# Patient Record
Sex: MALE
[De-identification: names, ages, dates, MRNs, and addresses within clinical notes are randomized per-mention and may not be internally consistent; named-entity substitution may affect disease eponyms.]

---

## 2017-03-07 ENCOUNTER — RX ONLY (OUTPATIENT)
Age: 72
Setting detail: RX ONLY
End: 2017-03-07

## 2017-09-05 ENCOUNTER — RX ONLY (OUTPATIENT)
Age: 72
Setting detail: RX ONLY
End: 2017-09-05

## 2017-09-12 ENCOUNTER — RX ONLY (OUTPATIENT)
Age: 72
Setting detail: RX ONLY
End: 2017-09-12

## 2017-11-09 ENCOUNTER — RX ONLY (OUTPATIENT)
Age: 72
Setting detail: RX ONLY
End: 2017-11-09

## 2017-11-28 ENCOUNTER — RX ONLY (OUTPATIENT)
Age: 72
Setting detail: RX ONLY
End: 2017-11-28

## 2018-04-24 ENCOUNTER — RX ONLY (OUTPATIENT)
Age: 73
Setting detail: RX ONLY
End: 2018-04-24

## 2018-05-01 ENCOUNTER — RX ONLY (OUTPATIENT)
Age: 73
Setting detail: RX ONLY
End: 2018-05-01

## 2018-05-22 ENCOUNTER — RX ONLY (OUTPATIENT)
Age: 73
Setting detail: RX ONLY
End: 2018-05-22

## 2018-06-19 ENCOUNTER — RX ONLY (OUTPATIENT)
Age: 73
Setting detail: RX ONLY
End: 2018-06-19

## 2018-06-26 ENCOUNTER — APPOINTMENT (RX ONLY)
Dept: URBAN - METROPOLITAN AREA CLINIC 319 | Facility: CLINIC | Age: 73
Setting detail: DERMATOLOGY
End: 2018-06-26

## 2018-06-26 DIAGNOSIS — Z48.817 ENCOUNTER FOR SURGICAL AFTERCARE FOLLOWING SURGERY ON THE SKIN AND SUBCUTANEOUS TISSUE: ICD-10-CM

## 2018-06-26 PROCEDURE — ? POST-OP WOUND CHECK

## 2018-06-26 PROCEDURE — 99024 POSTOP FOLLOW-UP VISIT: CPT

## 2018-06-26 ASSESSMENT — LOCATION SIMPLE DESCRIPTION DERM: LOCATION SIMPLE: LEFT MIDDLE FINGER

## 2018-06-26 ASSESSMENT — LOCATION ZONE DERM: LOCATION ZONE: FINGER

## 2018-06-26 ASSESSMENT — LOCATION DETAILED DESCRIPTION DERM: LOCATION DETAILED: LEFT PROXIMAL DORSAL MIDDLE FINGER

## 2018-06-26 NOTE — PROCEDURE: POST-OP WOUND CHECK
Detail Level: Detailed
Add 90903 Cpt? (Important Note: In 2017 The Use Of 02285 Is Being Tracked By Cms To Determine Future Global Period Reimbursement For Global Periods): yes

## 2018-06-29 ENCOUNTER — APPOINTMENT (RX ONLY)
Dept: URBAN - METROPOLITAN AREA CLINIC 319 | Facility: CLINIC | Age: 73
Setting detail: DERMATOLOGY
End: 2018-06-29

## 2018-06-29 DIAGNOSIS — Z48.817 ENCOUNTER FOR SURGICAL AFTERCARE FOLLOWING SURGERY ON THE SKIN AND SUBCUTANEOUS TISSUE: ICD-10-CM

## 2018-06-29 PROCEDURE — 99024 POSTOP FOLLOW-UP VISIT: CPT

## 2018-06-29 PROCEDURE — ? POST-OP WOUND CHECK

## 2018-06-29 ASSESSMENT — LOCATION ZONE DERM: LOCATION ZONE: FINGER

## 2018-06-29 ASSESSMENT — LOCATION SIMPLE DESCRIPTION DERM: LOCATION SIMPLE: LEFT MIDDLE FINGER

## 2018-06-29 ASSESSMENT — LOCATION DETAILED DESCRIPTION DERM: LOCATION DETAILED: LEFT PROXIMAL DORSAL MIDDLE FINGER

## 2018-06-29 NOTE — PROCEDURE: POST-OP WOUND CHECK
Detail Level: Detailed
Add 92085 Cpt? (Important Note: In 2017 The Use Of 64316 Is Being Tracked By Cms To Determine Future Global Period Reimbursement For Global Periods): yes
Additional Comments: Pt will change bandage daily and RTC on Tuesday 7/3 for s/r.
Body Location Override (Optional - Billing Will Still Be Based On Selected Body Map Location If Applicable): left 3rd finger
Wound Evaluated By: Natali

## 2018-07-03 ENCOUNTER — APPOINTMENT (RX ONLY)
Dept: URBAN - METROPOLITAN AREA CLINIC 319 | Facility: CLINIC | Age: 73
Setting detail: DERMATOLOGY
End: 2018-07-03

## 2018-07-03 DIAGNOSIS — Z48.817 ENCOUNTER FOR SURGICAL AFTERCARE FOLLOWING SURGERY ON THE SKIN AND SUBCUTANEOUS TISSUE: ICD-10-CM

## 2018-07-03 PROBLEM — N18.9 CHRONIC KIDNEY DISEASE, UNSPECIFIED: Status: ACTIVE | Noted: 2018-07-03

## 2018-07-03 PROBLEM — Z85.828 PERSONAL HISTORY OF OTHER MALIGNANT NEOPLASM OF SKIN: Status: ACTIVE | Noted: 2018-07-03

## 2018-07-03 PROCEDURE — ? POST-OP WOUND CHECK

## 2018-07-03 PROCEDURE — 99024 POSTOP FOLLOW-UP VISIT: CPT

## 2018-07-03 ASSESSMENT — LOCATION SIMPLE DESCRIPTION DERM: LOCATION SIMPLE: LEFT MIDDLE FINGER

## 2018-07-03 ASSESSMENT — LOCATION ZONE DERM: LOCATION ZONE: FINGER

## 2018-07-03 ASSESSMENT — LOCATION DETAILED DESCRIPTION DERM: LOCATION DETAILED: LEFT PROXIMAL DORSAL MIDDLE FINGER

## 2018-07-03 NOTE — PROCEDURE: POST-OP WOUND CHECK
Body Location Override (Optional - Billing Will Still Be Based On Selected Body Map Location If Applicable): left 3rd finger
Wound Assessment Override (Optional): graft was crusted and dry
Add 67100 Cpt? (Important Note: In 2017 The Use Of 34196 Is Being Tracked By Cms To Determine Future Global Period Reimbursement For Global Periods): yes
Wound Evaluated By: Dr Garces
Detail Level: Detailed
Additional Comments: Graft appears to viable but has crusting on the surface as he forgot to use white petrolatum.

## 2018-07-06 ENCOUNTER — APPOINTMENT (RX ONLY)
Dept: URBAN - METROPOLITAN AREA CLINIC 319 | Facility: CLINIC | Age: 73
Setting detail: DERMATOLOGY
End: 2018-07-06

## 2018-07-06 DIAGNOSIS — Z48.817 ENCOUNTER FOR SURGICAL AFTERCARE FOLLOWING SURGERY ON THE SKIN AND SUBCUTANEOUS TISSUE: ICD-10-CM

## 2018-07-06 PROCEDURE — ? POST-OP WOUND CHECK

## 2018-07-06 PROCEDURE — 99024 POSTOP FOLLOW-UP VISIT: CPT

## 2018-07-06 ASSESSMENT — LOCATION SIMPLE DESCRIPTION DERM: LOCATION SIMPLE: LEFT MIDDLE FINGER

## 2018-07-06 ASSESSMENT — LOCATION DETAILED DESCRIPTION DERM: LOCATION DETAILED: LEFT PROXIMAL DORSAL MIDDLE FINGER

## 2018-07-06 ASSESSMENT — LOCATION ZONE DERM: LOCATION ZONE: FINGER

## 2018-07-06 NOTE — PROCEDURE: POST-OP WOUND CHECK
Add 57583 Cpt? (Important Note: In 2017 The Use Of 68885 Is Being Tracked By Cms To Determine Future Global Period Reimbursement For Global Periods): yes
Detail Level: Detailed
Wound Assessment Override (Optional): graft was crusted and dry
Body Location Override (Optional - Billing Will Still Be Based On Selected Body Map Location If Applicable): left 3rd finger

## 2018-07-10 ENCOUNTER — APPOINTMENT (RX ONLY)
Dept: URBAN - METROPOLITAN AREA CLINIC 319 | Facility: CLINIC | Age: 73
Setting detail: DERMATOLOGY
End: 2018-07-10

## 2018-07-10 DIAGNOSIS — Z48.817 ENCOUNTER FOR SURGICAL AFTERCARE FOLLOWING SURGERY ON THE SKIN AND SUBCUTANEOUS TISSUE: ICD-10-CM

## 2018-07-10 PROCEDURE — 99024 POSTOP FOLLOW-UP VISIT: CPT

## 2018-07-10 PROCEDURE — ? POST-OP WOUND CHECK

## 2018-07-10 ASSESSMENT — LOCATION SIMPLE DESCRIPTION DERM: LOCATION SIMPLE: LEFT MIDDLE FINGER

## 2018-07-10 ASSESSMENT — LOCATION ZONE DERM: LOCATION ZONE: FINGER

## 2018-07-10 ASSESSMENT — LOCATION DETAILED DESCRIPTION DERM: LOCATION DETAILED: LEFT PROXIMAL DORSAL MIDDLE FINGER

## 2018-07-10 NOTE — PROCEDURE: POST-OP WOUND CHECK
Body Location Override (Optional - Billing Will Still Be Based On Selected Body Map Location If Applicable): left 3rd finger
Add 03964 Cpt? (Important Note: In 2017 The Use Of 85215 Is Being Tracked By Cms To Determine Future Global Period Reimbursement For Global Periods): yes
Detail Level: Detailed

## 2018-07-17 ENCOUNTER — APPOINTMENT (RX ONLY)
Dept: URBAN - METROPOLITAN AREA CLINIC 319 | Facility: CLINIC | Age: 73
Setting detail: DERMATOLOGY
End: 2018-07-17

## 2018-07-17 DIAGNOSIS — D485 NEOPLASM OF UNCERTAIN BEHAVIOR OF SKIN: ICD-10-CM

## 2018-07-17 DIAGNOSIS — Z48.817 ENCOUNTER FOR SURGICAL AFTERCARE FOLLOWING SURGERY ON THE SKIN AND SUBCUTANEOUS TISSUE: ICD-10-CM

## 2018-07-17 PROBLEM — C44.629 SQUAMOUS CELL CARCINOMA OF SKIN OF LEFT UPPER LIMB, INCLUDING SHOULDER: Status: ACTIVE | Noted: 2018-07-17

## 2018-07-17 PROBLEM — C44.729 SQUAMOUS CELL CARCINOMA OF SKIN OF LEFT LOWER LIMB, INCLUDING HIP: Status: ACTIVE | Noted: 2018-07-17

## 2018-07-17 PROBLEM — C44.722 SQUAMOUS CELL CARCINOMA OF SKIN OF RIGHT LOWER LIMB, INCLUDING HIP: Status: ACTIVE | Noted: 2018-07-17

## 2018-07-17 PROBLEM — D48.5 NEOPLASM OF UNCERTAIN BEHAVIOR OF SKIN: Status: ACTIVE | Noted: 2018-07-17

## 2018-07-17 PROBLEM — C44.622 SQUAMOUS CELL CARCINOMA OF SKIN OF RIGHT UPPER LIMB, INCLUDING SHOULDER: Status: ACTIVE | Noted: 2018-07-17

## 2018-07-17 PROBLEM — C44.320 SQUAMOUS CELL CARCINOMA OF SKIN OF UNSPECIFIED PARTS OF FACE: Status: ACTIVE | Noted: 2018-07-17

## 2018-07-17 PROBLEM — D04.4 CARCINOMA IN SITU OF SKIN OF SCALP AND NECK: Status: ACTIVE | Noted: 2018-07-17

## 2018-07-17 PROBLEM — L30.8 OTHER SPECIFIED DERMATITIS: Status: ACTIVE | Noted: 2018-07-17

## 2018-07-17 PROBLEM — C44.619 BASAL CELL CARCINOMA OF SKIN OF LEFT UPPER LIMB, INCLUDING SHOULDER: Status: ACTIVE | Noted: 2018-07-17

## 2018-07-17 PROBLEM — C44.42 SQUAMOUS CELL CARCINOMA OF SKIN OF SCALP AND NECK: Status: ACTIVE | Noted: 2018-07-17

## 2018-07-17 PROCEDURE — ? COUNSELING

## 2018-07-17 PROCEDURE — 99212 OFFICE O/P EST SF 10 MIN: CPT

## 2018-07-17 PROCEDURE — ? POST-OP WOUND CHECK

## 2018-07-17 PROCEDURE — ? ADDITIONAL NOTES

## 2018-07-17 PROCEDURE — 99024 POSTOP FOLLOW-UP VISIT: CPT

## 2018-07-17 ASSESSMENT — LOCATION SIMPLE DESCRIPTION DERM
LOCATION SIMPLE: LEFT HAND
LOCATION SIMPLE: RIGHT KNEE
LOCATION SIMPLE: LEFT MIDDLE FINGER
LOCATION SIMPLE: RIGHT HAND

## 2018-07-17 ASSESSMENT — LOCATION ZONE DERM
LOCATION ZONE: FINGER
LOCATION ZONE: HAND
LOCATION ZONE: LEG

## 2018-07-17 ASSESSMENT — LOCATION DETAILED DESCRIPTION DERM
LOCATION DETAILED: LEFT PROXIMAL DORSAL MIDDLE FINGER
LOCATION DETAILED: RIGHT LATERAL KNEE
LOCATION DETAILED: RIGHT ULNAR DORSAL HAND
LOCATION DETAILED: LEFT RADIAL DORSAL HAND

## 2018-07-17 NOTE — PROCEDURE: POST-OP WOUND CHECK
Wound Evaluated By: Dr Jil Garces
Add 19768 Cpt? (Important Note: In 2017 The Use Of 16614 Is Being Tracked By Cms To Determine Future Global Period Reimbursement For Global Periods): yes
Detail Level: Detailed

## 2018-07-17 NOTE — PROCEDURE: COUNSELING
Patient Specific Counseling (Will Not Stick From Patient To Patient): Pt will get us the contact info for his nephrologist and the will be contacted to discuss the possibility of a systemic medication for skin cancers.
Detail Level: Detailed

## 2018-07-17 NOTE — PROCEDURE: ADDITIONAL NOTES
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